# Patient Record
Sex: MALE | Race: WHITE | Employment: OTHER | URBAN - METROPOLITAN AREA
[De-identification: names, ages, dates, MRNs, and addresses within clinical notes are randomized per-mention and may not be internally consistent; named-entity substitution may affect disease eponyms.]

---

## 2021-01-15 ENCOUNTER — HOSPITAL ENCOUNTER (OUTPATIENT)
Dept: GENERAL RADIOLOGY | Age: 56
Discharge: HOME | End: 2021-01-15
Attending: INTERNAL MEDICINE
Payer: MEDICARE

## 2021-01-15 DIAGNOSIS — M54.2 CERVICALGIA: ICD-10-CM

## 2021-01-15 DIAGNOSIS — M47.816 LUMBAR SPONDYLOSIS: ICD-10-CM

## 2021-01-15 PROCEDURE — 72052 X-RAY EXAM NECK SPINE 6/>VWS: CPT

## 2021-01-15 PROCEDURE — 72114 X-RAY EXAM L-S SPINE BENDING: CPT

## 2023-03-20 ENCOUNTER — ANESTHESIA (OUTPATIENT)
Dept: ENDOSCOPY | Age: 58
End: 2023-03-20
Payer: MEDICARE

## 2023-03-20 ENCOUNTER — HOSPITAL ENCOUNTER (OUTPATIENT)
Age: 58
Setting detail: OUTPATIENT SURGERY
Discharge: HOME | End: 2023-03-20
Attending: INTERNAL MEDICINE | Admitting: INTERNAL MEDICINE
Payer: MEDICARE

## 2023-03-20 ENCOUNTER — ANESTHESIA EVENT (OUTPATIENT)
Dept: ENDOSCOPY | Age: 58
End: 2023-03-20
Payer: MEDICARE

## 2023-03-20 VITALS
TEMPERATURE: 98.9 F | SYSTOLIC BLOOD PRESSURE: 115 MMHG | HEART RATE: 60 BPM | OXYGEN SATURATION: 95 % | RESPIRATION RATE: 20 BRPM | DIASTOLIC BLOOD PRESSURE: 57 MMHG | HEIGHT: 71 IN | BODY MASS INDEX: 23.23 KG/M2 | WEIGHT: 165.9 LBS

## 2023-03-20 PROCEDURE — 77030029384 HC SNR POLYP CAPTVR II BSC -B: Performed by: INTERNAL MEDICINE

## 2023-03-20 PROCEDURE — 2709999900 HC NON-CHARGEABLE SUPPLY: Performed by: INTERNAL MEDICINE

## 2023-03-20 PROCEDURE — 74011250636 HC RX REV CODE- 250/636: Performed by: STUDENT IN AN ORGANIZED HEALTH CARE EDUCATION/TRAINING PROGRAM

## 2023-03-20 PROCEDURE — 76060000032 HC ANESTHESIA 0.5 TO 1 HR: Performed by: INTERNAL MEDICINE

## 2023-03-20 PROCEDURE — 76040000007: Performed by: INTERNAL MEDICINE

## 2023-03-20 PROCEDURE — 74011250637 HC RX REV CODE- 250/637: Performed by: INTERNAL MEDICINE

## 2023-03-20 PROCEDURE — 88305 TISSUE EXAM BY PATHOLOGIST: CPT

## 2023-03-20 RX ORDER — VENLAFAXINE HYDROCHLORIDE 75 MG/1
CAPSULE, EXTENDED RELEASE ORAL
COMMUNITY
Start: 2023-02-11

## 2023-03-20 RX ORDER — TRAZODONE HYDROCHLORIDE 150 MG/1
TABLET ORAL
COMMUNITY

## 2023-03-20 RX ORDER — ZOLPIDEM TARTRATE 10 MG/1
1 TABLET ORAL
COMMUNITY

## 2023-03-20 RX ORDER — NALOXONE HYDROCHLORIDE 0.4 MG/ML
0.4 INJECTION, SOLUTION INTRAMUSCULAR; INTRAVENOUS; SUBCUTANEOUS
Status: DISCONTINUED | OUTPATIENT
Start: 2023-03-20 | End: 2023-03-20 | Stop reason: HOSPADM

## 2023-03-20 RX ORDER — SODIUM CHLORIDE 0.9 % (FLUSH) 0.9 %
5-40 SYRINGE (ML) INJECTION EVERY 8 HOURS
Status: DISCONTINUED | OUTPATIENT
Start: 2023-03-20 | End: 2023-03-20 | Stop reason: HOSPADM

## 2023-03-20 RX ORDER — SODIUM CHLORIDE 0.9 % (FLUSH) 0.9 %
5-40 SYRINGE (ML) INJECTION AS NEEDED
Status: DISCONTINUED | OUTPATIENT
Start: 2023-03-20 | End: 2023-03-20 | Stop reason: HOSPADM

## 2023-03-20 RX ORDER — ATROPINE SULFATE 0.1 MG/ML
0.5 INJECTION INTRAVENOUS
Status: DISCONTINUED | OUTPATIENT
Start: 2023-03-20 | End: 2023-03-20 | Stop reason: HOSPADM

## 2023-03-20 RX ORDER — FLUMAZENIL 0.1 MG/ML
0.2 INJECTION INTRAVENOUS
Status: DISCONTINUED | OUTPATIENT
Start: 2023-03-20 | End: 2023-03-20 | Stop reason: HOSPADM

## 2023-03-20 RX ORDER — DEXTROMETHORPHAN/PSEUDOEPHED 2.5-7.5/.8
1.2 DROPS ORAL
Status: DISCONTINUED | OUTPATIENT
Start: 2023-03-20 | End: 2023-03-20 | Stop reason: HOSPADM

## 2023-03-20 RX ORDER — OXYCODONE HYDROCHLORIDE 15 MG/1
TABLET ORAL
COMMUNITY
Start: 2023-03-10

## 2023-03-20 RX ORDER — SODIUM CHLORIDE 9 MG/ML
INJECTION, SOLUTION INTRAVENOUS
Status: DISCONTINUED | OUTPATIENT
Start: 2023-03-20 | End: 2023-03-20 | Stop reason: HOSPADM

## 2023-03-20 RX ORDER — NALOXONE HYDROCHLORIDE 4 MG/.1ML
SPRAY NASAL
COMMUNITY
Start: 2023-02-03

## 2023-03-20 RX ORDER — BACLOFEN 20 MG/1
40 TABLET ORAL 2 TIMES DAILY
COMMUNITY
Start: 2023-02-04

## 2023-03-20 RX ORDER — EPINEPHRINE 0.1 MG/ML
1 INJECTION INTRACARDIAC; INTRAVENOUS
Status: DISCONTINUED | OUTPATIENT
Start: 2023-03-20 | End: 2023-03-20 | Stop reason: HOSPADM

## 2023-03-20 RX ORDER — PROPOFOL 10 MG/ML
INJECTION, EMULSION INTRAVENOUS AS NEEDED
Status: DISCONTINUED | OUTPATIENT
Start: 2023-03-20 | End: 2023-03-20 | Stop reason: HOSPADM

## 2023-03-20 RX ORDER — OXCARBAZEPINE 300 MG/1
300 TABLET, FILM COATED ORAL 2 TIMES DAILY
COMMUNITY
Start: 2022-12-20

## 2023-03-20 RX ORDER — OMEGA-3-ACID ETHYL ESTERS 1 G/1
1 CAPSULE, LIQUID FILLED ORAL 2 TIMES DAILY
COMMUNITY
Start: 2023-02-04

## 2023-03-20 RX ORDER — TOPIRAMATE 50 MG/1
50 TABLET, FILM COATED ORAL 2 TIMES DAILY
COMMUNITY
Start: 2023-01-05

## 2023-03-20 RX ORDER — SODIUM CHLORIDE 9 MG/ML
50 INJECTION, SOLUTION INTRAVENOUS CONTINUOUS
Status: DISCONTINUED | OUTPATIENT
Start: 2023-03-20 | End: 2023-03-20 | Stop reason: HOSPADM

## 2023-03-20 RX ADMIN — PROPOFOL 25 MG: 10 INJECTION, EMULSION INTRAVENOUS at 09:18

## 2023-03-20 RX ADMIN — PROPOFOL 25 MG: 10 INJECTION, EMULSION INTRAVENOUS at 09:29

## 2023-03-20 RX ADMIN — PROPOFOL 75 MG: 10 INJECTION, EMULSION INTRAVENOUS at 09:00

## 2023-03-20 RX ADMIN — PROPOFOL 25 MG: 10 INJECTION, EMULSION INTRAVENOUS at 09:21

## 2023-03-20 RX ADMIN — PROPOFOL 25 MG: 10 INJECTION, EMULSION INTRAVENOUS at 09:13

## 2023-03-20 RX ADMIN — SODIUM CHLORIDE: 900 INJECTION, SOLUTION INTRAVENOUS at 08:59

## 2023-03-20 RX ADMIN — PROPOFOL 25 MG: 10 INJECTION, EMULSION INTRAVENOUS at 09:11

## 2023-03-20 RX ADMIN — PROPOFOL 25 MG: 10 INJECTION, EMULSION INTRAVENOUS at 09:08

## 2023-03-20 RX ADMIN — PROPOFOL 25 MG: 10 INJECTION, EMULSION INTRAVENOUS at 09:28

## 2023-03-20 RX ADMIN — PROPOFOL 25 MG: 10 INJECTION, EMULSION INTRAVENOUS at 09:23

## 2023-03-20 RX ADMIN — PROPOFOL 25 MG: 10 INJECTION, EMULSION INTRAVENOUS at 09:04

## 2023-03-20 NOTE — ANESTHESIA PREPROCEDURE EVALUATION
Relevant Problems   No relevant active problems       Anesthetic History   No history of anesthetic complications            Review of Systems / Medical History  Patient summary reviewed, nursing notes reviewed and pertinent labs reviewed    Pulmonary  Within defined limits                 Neuro/Psych   Within defined limits           Cardiovascular                  Exercise tolerance: >4 METS     GI/Hepatic/Renal                Endo/Other             Other Findings   Comments: stiffman's syndrome           Physical Exam    Airway  Mallampati: I  TM Distance: > 6 cm  Neck ROM: normal range of motion   Mouth opening: Normal     Cardiovascular    Rhythm: regular           Dental  No notable dental hx       Pulmonary  Breath sounds clear to auscultation               Abdominal         Other Findings            Anesthetic Plan    ASA: 2  Anesthesia type: MAC          Induction: Intravenous  Anesthetic plan and risks discussed with: Patient

## 2023-03-20 NOTE — H&P
Pre-Endoscopy H&P   Chief complaint: screening or surveillance of previous colon polyps    HPI:  Patient presents for procedure. The indication for the procedure, the patient's history and the patient's current medications are reviewed prior to the procedure and that data is reported on the endoscopy note in the chart to which this document is attached. Any significant complaints with regard to organ systems will be noted, and if not mentioned then a review of systems is not contributory. Past Medical History:   Diagnosis Date    Ill-defined condition     Stiff Person Syndrome      Past Surgical History:   Procedure Laterality Date    HX ORTHOPAEDIC      HX OTHER SURGICAL      MI UNLISTED PROCEDURE ABDOMEN PERITONEUM & OMENTUM       Social   Social History     Tobacco Use    Smoking status: Every Day     Types: Cigarettes    Smokeless tobacco: Never   Substance Use Topics    Alcohol use: Not Currently      History reviewed. No pertinent family history. Allergies   Allergen Reactions    Demerol [Meperidine] Other (comments)      Prior to Admission Medications   Prescriptions Last Dose Informant Patient Reported? Taking? OTHER   Yes Yes   Sig: Patient takes a few other medications but cant remember what they are   OXcarbazepine (TRILEPTAL) 300 mg tablet 3/19/2023  Yes Yes   Sig: Take 300 mg by mouth two (2) times a day. baclofen (LIORESAL) 20 mg tablet 3/19/2023  Yes Yes   Sig: Take 40 mg by mouth two (2) times a day.   naloxone (NARCAN) 4 mg/actuation nasal spray Unknown  Yes No   Sig: He has never had to use it   omega-3 acid ethyl esters (LOVAZA) 1 gram capsule 3/19/2023  Yes Yes   Sig: Take 1 Capsule by mouth two (2) times a day. oxyCODONE IR (OXY-IR) 15 mg immediate release tablet 3/19/2023  Yes Yes   topiramate (TOPAMAX) 50 mg tablet 3/19/2023  Yes Yes   Sig: Take 50 mg by mouth two (2) times a day.    traZODone (DESYREL) 150 mg tablet 3/18/2023  Yes Yes   Sig: Take 2 tablets by mouth at bedtime venlafaxine-SR (EFFEXOR-XR) 75 mg capsule 3/19/2023  Yes Yes   Sig: TAKE 1 CAPSULE BY MOUTH EVERY DAY FOR DEPRESSION   zolpidem (AMBIEN) 10 mg tablet 3/18/2023  Yes Yes   Sig: Take 1 Tablet by mouth nightly. Facility-Administered Medications: None       PHYSICAL EXAM:  The patient is examined immediately prior to the procedure. There were no vitals taken for this visit. Gen: Appears comfortable, no distress. Pulm: comfortable respirations with no abnormal audible breath sounds  CV: heart regular, well perfused  GI: abdomen flat. ASSESSMENT:  Patient here for procedure. The indication for the procedure, the patient's history and the patient's current medications are reviewed prior to the procedure and that data is reported on the endoscopy note in the chart to which this document is attached. PLAN:  Informed consent discussion held, patient afforded an opportunity to ask questions and all questions answered. After being advised of the risks, benefits, and alternatives, the patient requested that we proceed and indicated so on a written consent form. Will proceed with procedure as planned.   Konrad Zayas MD

## 2023-03-20 NOTE — PROCEDURES
[address] [facility].  [address] [facility] [address] [place], [address]  [phone]                              Colonoscopy Procedure Note      Indications:    Screening colonoscopy     :  Kiet Cason MD    Staff: Endoscopy Lola Oris: Lin Mcgovern  Endoscopy RN-1: Barrie Ndiaye RN    Referring Provider: Other, MD Consuelo    Sedation: MAC    Procedure Details:  After informed consent was obtained with all risks and benefits of procedure explained and preoperative exam completed, the patient was taken to the endoscopy suite and placed in the left lateral decubitus position. Upon sequential sedation as per above, a digital rectal exam was performed per below. The Olympus videocolonoscope was inserted in the rectum and carefully advanced to the cecum, which was identified by the ileocecal valve and appendiceal orifice. The quality of preparation was excellent. [place] Bowel Prep Score : 3/3/3/9 . The colonoscope was slowly withdrawn with careful evaluation between folds. Retroflexion in the rectum was performed. Findings:   Rectum: normal   Sigmoid: normal   Descending Colon: normal  Transverse Colon: Three 3-6mm sessile polyps resected completely with cold snare and retrieved  Ascending Colon: Four 3-4mm sessile polyps resected completely with cold snare and retrieved  Cecum: normal     Specimen Removed:    ID Type Source Tests Collected by Time Destination   1 : Ascending Colon Polyp Preservative Colon, Ascending  Nini Anderson MD 3/20/2023 1197 Pathology   2 : Transverse Colon Polyp Preservative Colon, Transverse  Nini Anderson MD 3/20/2023 7338 Pathology       Complications: None. EBL:  none    Impression:    See Postoperative diagnosis above    Recommendations:   - Await pathology evaluation of biopsy / resected tissue  - Resume normal medications. - Resume previous diet.   - Recommend repeat colonoscopy pending path report    Discharge Disposition:  Home in the company of a  when able to ambulate.     Josselin Santos MD  3/20/2023  9:34 AM

## 2023-03-20 NOTE — DISCHARGE INSTRUCTIONS
[id number] [facility] Antony Floyd MD  [phone]      March 20, 2023    Destiney Gutierrez  YOB: 1965    COLONOSCOPY DISCHARGE INSTRUCTIONS    If there is redness at IV site you should apply warm compress to area. If redness or soreness persist contact Dr. Melissa Floyd or your primary care doctor. There may be a slight amount of blood passed from the rectum. Gaseous discomfort may develop, but walking, belching will help relieve this. You may not operate a vehicle for 12 hours  You may not operate machinery or dangerous appliances for rest of today  You may not drink alcoholic beverages for 12 hours  Avoid making any critical decisions for 24 hours    DIET:  You may resume your normal diet, but some patients find that heavy or large meals may lead to indigestion or vomiting. I suggest a light meal as first food intake. MEDICATIONS:  The use of some over-the-counter pain medication may lead to bleeding after colon biopsies or polyp removal.  Tylenol (also called acetaminophen) is safe to take even if you have had colonoscopy with polyp removal.  Based on the procedure you can resume baby-dose aspirin (81 mg) and may safely take anticoagulation (like apixaban (Eliquis), dabigatran (Pradaxa), edoxaban (Everrett Blandon), rivaroxaban (Xarelto) or warfarin (Coumadin)) or antiplatelet medications (high dose aspirin 325mg, Clopidogrel (Plavix), Prasugrel (Effient), Ticagrelor (Brilinta))for the next two (48 hours) days. ACTIVITY:  You may resume your normal household activities, but it is recommended that you spend the remainder of the day resting -  avoid any strenuous activity. CALL DR. ROSSI'S OFFICE IF:  Increasing pain, nausea, vomiting  Abdominal distension (swelling)  Significant new or increased bleeding (oral or rectal)  Fever/Chills  Chest pain/shortness of breath                       Additional instructions:   7 polyps identified and resected on this exam.    We recommend a repeat colonoscopy in 3 years. This amount of time will be confirmed in a pathology report letter that will be mailed to you within 10 business days. It was an honor to be your doctor today. Please let me or my office staff know if you have any feedback about today's procedure. Chris Mendez MD, March 20, 2023    Colonoscopy saves lives, and can prevent colon cancer. Everyone aged 48 or older needs colonoscopy.   Tell your family and friends: get the test!

## 2023-03-21 NOTE — ANESTHESIA POSTPROCEDURE EVALUATION
Post-Anesthesia Evaluation and Assessment    Patient: Cindy Ponce MRN: [id number]  SSN: [id number]    YOB: 1965  Age: 62 y.o. Sex: male      I have evaluated the patient and they are stable and ready for discharge from the PACU. Cardiovascular Function/Vital Signs  Visit Vitals  BP (!) 115/57   Pulse 60   Temp 37.2 °C (98.9 °F)   Resp 20   Ht 5' 11\" (1.803 m)   Wt 75.3 kg (165 lb 14.4 oz)   SpO2 95%   BMI 23.14 kg/m²       Patient is status post MAC anesthesia for Procedure(s):  COLONOSCOPY  ENDOSCOPIC POLYPECTOMY. Nausea/Vomiting: None    Postoperative hydration reviewed and adequate. Pain:  Pain Scale 1: Numeric (0 - 10) (03/20/23 0950)  Pain Intensity 1: 0 (03/20/23 0950)   Managed    Neurological Status: At baseline    Mental Status, Level of Consciousness: Alert and  oriented to person, place, and time    Pulmonary Status:   O2 Device: None (Room air) (03/20/23 0939)   Adequate oxygenation and airway patent    Complications related to anesthesia: None    Post-anesthesia assessment completed. No concerns    Signed By: Blondie Goldberg, MD     March 21, 2023              Procedure(s):  COLONOSCOPY  ENDOSCOPIC POLYPECTOMY. MAC    <BSHSIANPOST>    INITIAL Post-op Vital signs:   Vitals Value Taken Time   /57 03/20/23 1001   Temp 37.2 °C (98.9 °F) 03/20/23 0939   Pulse 60 03/20/23 1003   Resp 17 03/20/23 1003   SpO2 97 % 03/20/23 1001   Vitals shown include unvalidated device data.

## 2023-05-16 RX ORDER — VENLAFAXINE HYDROCHLORIDE 75 MG/1
CAPSULE, EXTENDED RELEASE ORAL
COMMUNITY
Start: 2023-02-11

## 2023-05-16 RX ORDER — OXCARBAZEPINE 300 MG/1
300 TABLET, FILM COATED ORAL 2 TIMES DAILY
COMMUNITY
Start: 2022-12-20

## 2023-05-16 RX ORDER — OXYCODONE HYDROCHLORIDE 15 MG/1
TABLET ORAL
COMMUNITY
Start: 2023-03-10

## 2023-05-16 RX ORDER — TRAZODONE HYDROCHLORIDE 150 MG/1
2 TABLET ORAL NIGHTLY
COMMUNITY

## 2023-05-16 RX ORDER — ZOLPIDEM TARTRATE 10 MG/1
1 TABLET ORAL NIGHTLY
COMMUNITY

## 2023-05-16 RX ORDER — NALOXONE HYDROCHLORIDE 4 MG/.1ML
SPRAY NASAL
COMMUNITY
Start: 2023-02-03

## 2023-05-16 RX ORDER — BACLOFEN 20 MG/1
40 TABLET ORAL 2 TIMES DAILY
COMMUNITY
Start: 2023-02-04

## 2023-05-16 RX ORDER — TOPIRAMATE 50 MG/1
50 TABLET, FILM COATED ORAL 2 TIMES DAILY
COMMUNITY
Start: 2023-01-05

## 2023-05-16 RX ORDER — CHLORAL HYDRATE 500 MG
1 CAPSULE ORAL 2 TIMES DAILY
COMMUNITY
Start: 2023-02-04

## (undated) DEVICE — SPECIMEN TRAP QUAD CHMBR -- TRAPEASE

## (undated) DEVICE — TUBING HYDR IRR --

## (undated) DEVICE — SNARE POLYP RND STFF 10MM -- CAPTIVATOR COLD